# Patient Record
Sex: FEMALE | Race: WHITE | NOT HISPANIC OR LATINO | Employment: OTHER | URBAN - METROPOLITAN AREA
[De-identification: names, ages, dates, MRNs, and addresses within clinical notes are randomized per-mention and may not be internally consistent; named-entity substitution may affect disease eponyms.]

---

## 2019-05-16 ENCOUNTER — HOSPITAL ENCOUNTER (EMERGENCY)
Facility: OTHER | Age: 79
Discharge: HOME | End: 2019-05-16
Attending: EMERGENCY MEDICINE
Payer: MEDICARE

## 2019-05-16 VITALS
OXYGEN SATURATION: 95 % | DIASTOLIC BLOOD PRESSURE: 84 MMHG | WEIGHT: 198.88 LBS | HEART RATE: 100 BPM | BODY MASS INDEX: 35.24 KG/M2 | TEMPERATURE: 99 F | SYSTOLIC BLOOD PRESSURE: 183 MMHG | HEIGHT: 63 IN | RESPIRATION RATE: 20 BRPM

## 2019-05-16 DIAGNOSIS — S65.515A LACERATION OF BLOOD VESSEL OF LEFT RING FINGER, INITIAL ENCOUNTER: ICD-10-CM

## 2019-05-16 DIAGNOSIS — S60.042A CONTUSION OF LEFT RING FINGER WITHOUT DAMAGE TO NAIL, INITIAL ENCOUNTER: Primary | ICD-10-CM

## 2019-05-16 PROCEDURE — 90715 TDAP VACCINE 7 YRS/> IM: CPT | Performed by: EMERGENCY MEDICINE

## 2019-05-16 PROCEDURE — 99284 EMERGENCY DEPT VISIT MOD MDM: CPT | Mod: 25

## 2019-05-16 PROCEDURE — 12001 RPR S/N/AX/GEN/TRNK 2.5CM/<: CPT

## 2019-05-16 PROCEDURE — 90471 IMMUNIZATION ADMIN: CPT | Performed by: EMERGENCY MEDICINE

## 2019-05-16 PROCEDURE — 63600175 PHARM REV CODE 636 W HCPCS: Performed by: EMERGENCY MEDICINE

## 2019-05-16 RX ADMIN — CLOSTRIDIUM TETANI TOXOID ANTIGEN (FORMALDEHYDE INACTIVATED), CORYNEBACTERIUM DIPHTHERIAE TOXOID ANTIGEN (FORMALDEHYDE INACTIVATED), BORDETELLA PERTUSSIS TOXOID ANTIGEN (GLUTARALDEHYDE INACTIVATED), BORDETELLA PERTUSSIS FILAMENTOUS HEMAGGLUTININ ANTIGEN (FORMALDEHYDE INACTIVATED), BORDETELLA PERTUSSIS PERTACTIN ANTIGEN, AND BORDETELLA PERTUSSIS FIMBRIAE 2/3 ANTIGEN 0.5 ML: 5; 2; 2.5; 5; 3; 5 INJECTION, SUSPENSION INTRAMUSCULAR at 09:05

## 2019-05-17 NOTE — ED NOTES
Pt c/o ring stuck on the ring finger of the L hand after her son slammed a door on her hand. Pt is A & O x 3, denies SOB, fever, chills and N/V/D. Skin is warm, dry and pink.

## 2019-05-17 NOTE — ED PROVIDER NOTES
Encounter Date: 5/16/2019    SCRIBE #1 NOTE: I, Elvira Mcgowan, am scribing for, and in the presence of, Dr. Chung.       History     Chief Complaint   Patient presents with    ring stuck on finger     got hand stuck in a door, ring stuck on L hand ring finger     Time seen by provider: 9:38 PM    This is a 78 y.o. female who presents with complaint of ring stuck on her left ring finger. She reports her ring was caught in a door, which put pressure on her finger. The ring was removed on arrival to the ED and prior to initial interview. She reports associated swelling and wound. She denies fever or any pain. She is unsure if UTD on tetanus.    The history if provided by the patient.        Review of patient's allergies indicates:  No Known Allergies  Past Medical History:   Diagnosis Date    Diabetes mellitus     Lymphoma      Past Surgical History:   Procedure Laterality Date    excision of cancerous gland       History reviewed. No pertinent family history.  Social History     Tobacco Use    Smoking status: Never Smoker    Smokeless tobacco: Never Used   Substance Use Topics    Alcohol use: Never     Frequency: Never    Drug use: Never     Review of Systems  ROS: As per HPI and below:   General: No fever. No chills. No generalized weakness.   HENT: No sore throat. No rhinorrhea. No facial pain. No facial swelling.   Eyes: No visual changes. No eye pain.   Cardiovascular: No chest pain.   Respiratory: No dyspnea. No cough.   GI: No abdominal pain. No nausea. No vomiting. No diarrhea.   : No dysuria. No hematuria.   Skin: Ring stuck on finger. Wound. No rashes.  Neuro: No focal weakness. No headache. No syncope.  Musculoskeletal: Finger swelling. No extremity pain.  Psych: No acute changes.  All other systems negative.    Physical Exam     Initial Vitals [05/16/19 2118]   BP Pulse Resp Temp SpO2   (!) 183/84 100 20 98.7 °F (37.1 °C) 95 %      MAP       --         Physical Exam  BP (!) 183/84 (BP Location:  "Left arm, Patient Position: Sitting)   Pulse 100   Temp 98.7 °F (37.1 °C) (Oral)   Resp 20   Ht 5' 3" (1.6 m)   Wt 90.2 kg (198 lb 13.7 oz)   SpO2 95%   BMI 35.23 kg/m²     Nursing note and vitals reviewed.  Constitutional: AAOx3. Well appearing.   Eyes: EOMI. No discharge. Anicteric.  HENT:   Mouth/Throat: Oropharynx is clear and moist. Uvula midline.   Neck: Normal range of motion. Neck supple.    Cardiovascular: Normal rate  Pulmonary/Chest: No respiratory distress.   Abdominal: No distension   Musculoskeletal: Normal range of motion.   Neurological: GCS15. Alert and oriented to person, place, and time. No gross focal strength or light touch deficit.   Skin: Skin is warm and dry. Left Hand: Edema of ring finger PIP. Volar shallow abraision to proximal ring finger. 6 mm laceration at volar crease of ring finger MCP. Very shallow laceration at the volar aspect distal to the 6 mm laceration which is not amenable to repair. Neurovascularly intact distally. Full ROM of all joints of hand.  Psychiatric: Behavior is normal. Judgment normal.    ED Course   Procedures  Labs Reviewed - No data to display       Imaging Results    None       ===========================================  Lac Repair  Performed by: MAC FRANCIS  Authorized by: MAC FRANCIS  Consent Done: Yes  Consent: Verbal consent obtained.  Risks and benefits: risks, benefits and alternatives were discussed  Required items: required blood products, implants, devices, and special equipment available  Patient identity confirmed: INES, provided demographic data and arm band  Time out: Immediately prior to procedure a "time out" was called to verify the correct patient, procedure, equipment, support staff and site/side marked as required.  Location / length: 6 mm  Foreign bodies: no foreign bodies  Tendon involvement: none  Nerve involvement: none  Vascular damage: no  Preparation: Patient was prepped and draped in the usual sterile fashion.  Irrigation " solution: saline  Irrigation method: syringe  Amount of cleaning: extensive  Debridement: none  Skin closure: skin adhesive  Dressing: non  Patient tolerance: Patient tolerated the procedure well with no immediate complications.  ===========================================                  Scribe Attestation:   Scribe #1: I performed the above scribed service and the documentation accurately describes the services I performed. I attest to the accuracy of the note.    Attending Attestation:           Physician Attestation for Scribe:  Physician Attestation Statement for Scribe #1: I, Dr. Chung, reviewed documentation, as scribed by Elvira Mcgowan in my presence, and it is both accurate and complete.                 ED Course as of May 16 2229   Thu May 16, 2019   2209 Patient is a 78-year-old female who presents for evaluation after accidentally striking ring on her left hand causing contusion to the base of her ring finger, and swelling.  She was unable to remove the ring.  Here the ring was removed with a ring cutter, revealing shallow lacerations and abrasions, edema and ecchymosis of the PIP, 6 mm full-thickness laceration at the volar aspect of the MCP.   Laceration repaired as documented with skin adhesive.   I discussed with patient and/or guardian/caretaker that this evaluation in the ED does not suggest any emergent or life threatening medical condition requiring admission or immediate intervention beyond what was provided in the ED. Regardless, an unremarkable evaluation in the ED does not preclude the development or presence of a serious or life threatening condition. As such, patient was instructed to return immediately for any worsening or change in current symptoms.     I had a detailed discussion with patient  and/or guardian/caretaker regarding findings, plan, return precautions, importance of medication adherence, need to follow-up with a PCP and specialist. All questions answered.   Patient's son at  the bedside for discussion  Note was created using voice recognition software. Note may have occasional typographical errors that may not have been identified and edited despite initial review prior to signing.     [RC]      ED Course User Index  [RC] Gwyn Chung MD     Clinical Impression:     1. Contusion of left ring finger without damage to nail, initial encounter    2. Laceration of blood vessel of left ring finger, initial encounter                                 Gwyn Chung MD  05/17/19 3504

## 2019-05-17 NOTE — DISCHARGE INSTRUCTIONS
Call your primary care doctor to make the first available appointment.     Keep all your medical appointments.     Take your regular medication as prescribed. Contact your primary care provider before running out of medication, or for any problems obtaining them.    Do not drive or operate heavy machinery while taking opioid or muscle relaxing medications. Examples include norco, percocet, xanax, valium, flexeril.     Overuse or long term use of pain and sedating medication may lead to addiction, dependence, organ failure, family and work problems, legal problems, accidental overdose and death.    If you do not have health insurance, you probably qualify for heavily discounted rates:  Call 1-157.506.5030 ([facility] hotline) or go to www.[place].gov    Your evaluation in the ED does not suggest any emergent or life threatening medical condition requiring admission or immediate intervention beyond that provided in the ED.   However, the signs of a serious problem sometimes take more time to appear.   RETURN TO THE ER if any of the following occur:    Weakness, dizziness, fainting, or loss of consciousness   Fever of 100.4ºF (38ºC) or higher  Any worse symptoms  Any new or concerning symptoms    Keep the wound clean and dry. Each day you may clean it gently with soap and water, pat dry, re-bandage with gauze and tape.  Return to the ER if the area becomes more red, more warm, more swollen, or more painful.    Limit sun exposure and use sunscreen on the area for 1 year to minimize scarring.